# Patient Record
Sex: FEMALE | Race: BLACK OR AFRICAN AMERICAN | Employment: FULL TIME
[De-identification: names, ages, dates, MRNs, and addresses within clinical notes are randomized per-mention and may not be internally consistent; named-entity substitution may affect disease eponyms.]

---

## 2023-01-25 ENCOUNTER — NURSE TRIAGE (OUTPATIENT)
Dept: OTHER | Facility: CLINIC | Age: 33
End: 2023-01-25

## 2023-01-25 NOTE — TELEPHONE ENCOUNTER
Location of patient: [facility] call from [facility] at [facility] with [employer] Complaint; Patient with Red Flag Complaint requesting to establish care with Tod WINTERS . Subjective: Caller states headache on the right side of temporal area and right eye. Unsure of cause. Current Symptoms: Headache    Onset: 4 days ago; gradual    Associated Symptoms: NA    Pain Severity: 8/10; throbbing; intermittent    Temperature: Denies     What has been tried: Motrin    LMP:  12/21/22  Pregnant: No    Recommended disposition: See in Office Today or Tomorrow    Care advice provided, patient verbalizes understanding; denies any other questions or concerns; instructed to call back for any new or worsening symptoms. Patient/Caller agrees with recommended disposition; writer provided warm transfer to Duran Martins at [facility] for appointment scheduling    Attention Provider: Thank you for allowing me to participate in the care of your patient. The patient was connected to triage in response to information provided to the [facility]. Please do not respond through this encounter as the response is not directed to a shared pool.        Reason for Disposition   Unexplained headache that is present > 24 hours    Protocols used: Headache-ADULT-[place]